# Patient Record
Sex: FEMALE | Race: BLACK OR AFRICAN AMERICAN | NOT HISPANIC OR LATINO | Employment: UNEMPLOYED | URBAN - METROPOLITAN AREA
[De-identification: names, ages, dates, MRNs, and addresses within clinical notes are randomized per-mention and may not be internally consistent; named-entity substitution may affect disease eponyms.]

---

## 2020-07-31 PROBLEM — N18.9 ACUTE ON CHRONIC RENAL FAILURE: Status: ACTIVE | Noted: 2020-07-31

## 2020-07-31 PROBLEM — I16.0 HYPERTENSIVE URGENCY: Status: ACTIVE | Noted: 2020-07-31

## 2020-07-31 PROBLEM — Z72.0 TOBACCO ABUSE: Status: ACTIVE | Noted: 2020-07-31

## 2020-07-31 PROBLEM — N17.9 ACUTE ON CHRONIC RENAL FAILURE: Status: ACTIVE | Noted: 2020-07-31

## 2020-07-31 PROBLEM — R10.30 LOWER ABDOMINAL PAIN: Status: ACTIVE | Noted: 2020-07-31

## 2020-07-31 PROBLEM — E11.9 TYPE 2 DIABETES MELLITUS: Status: ACTIVE | Noted: 2020-07-31

## 2020-08-02 PROBLEM — R11.0 NAUSEA: Status: ACTIVE | Noted: 2020-08-02

## 2020-08-06 PROBLEM — R11.0 NAUSEA: Status: RESOLVED | Noted: 2020-08-02 | Resolved: 2020-08-06

## 2022-03-18 ENCOUNTER — TELEPHONE (OUTPATIENT)
Dept: TRANSPLANT | Facility: CLINIC | Age: 62
End: 2022-03-18

## 2022-03-23 ENCOUNTER — TELEPHONE (OUTPATIENT)
Dept: TRANSPLANT | Facility: CLINIC | Age: 62
End: 2022-03-23

## 2022-03-23 ENCOUNTER — DOCUMENTATION ONLY (OUTPATIENT)
Dept: TRANSPLANT | Facility: CLINIC | Age: 62
End: 2022-03-23

## 2022-03-23 NOTE — TELEPHONE ENCOUNTER
----- Message from Dara Santos LCSW sent at 3/22/2022  3:37 PM CDT -----  I would recommend she get another drug screen (blood) prior to coming in for eval. Given her age and history of positive screens she will probably have to go to rehab or some kind of treatment.  ----- Message -----  From: Michelle Abadie, RN  Sent: 3/18/2022   5:00 PM CDT  To: Dara Santos LCSW    She had one on 11/09/20 that ws positive for Benzos, THC, and Opiates. She had one on 01/06/21 that was positive for Benzos and THC.      ----- Message -----  From: Dara Santos LCSW  Sent: 3/18/2022   3:34 PM CDT  To: Michelle Abadie, RN    Has she had any negative screens since?    ----- Message -----  From: Michelle Abadie, RN  Sent: 3/18/2022   3:13 PM CDT  To: [facility]    Hello Team,    We have received a referral on the above patient. A drug screen in [facility] from 07/31/20 indicates the findings below:    Component Ref Range & Units 1 yr ago   Amphetamine Screen, Ur  Negative   Barbiturate Screen, Ur  Negative   Benzodiazepines  Presumptive Positive   Cocaine (Metab.)  Presumptive Positive   Opiate Scrn, Ur  Presumptive Positive   Phencyclidine  Negative   THC  Presumptive Positive   Tricyclic Antidepressants (TCA), Urine  Negative     Her medical history does indicate: Polysubstance abuse.    A SW review is needed before we can process this referral.    Thanks,  Araceli